# Patient Record
Sex: FEMALE | HISPANIC OR LATINO | Employment: STUDENT | URBAN - METROPOLITAN AREA
[De-identification: names, ages, dates, MRNs, and addresses within clinical notes are randomized per-mention and may not be internally consistent; named-entity substitution may affect disease eponyms.]

---
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
## 2024-05-23 ENCOUNTER — APPOINTMENT (OUTPATIENT)
Age: 19
End: 2024-05-23
Payer: COMMERCIAL

## 2024-05-23 ENCOUNTER — OFFICE VISIT (OUTPATIENT)
Age: 19
End: 2024-05-23
Payer: COMMERCIAL

## 2024-05-23 VITALS
OXYGEN SATURATION: 95 % | HEART RATE: 88 BPM | HEIGHT: 64 IN | DIASTOLIC BLOOD PRESSURE: 74 MMHG | RESPIRATION RATE: 17 BRPM | WEIGHT: 225.4 LBS | BODY MASS INDEX: 38.48 KG/M2 | TEMPERATURE: 97.5 F | SYSTOLIC BLOOD PRESSURE: 114 MMHG

## 2024-05-23 DIAGNOSIS — Z11.4 SCREENING FOR HIV (HUMAN IMMUNODEFICIENCY VIRUS): ICD-10-CM

## 2024-05-23 DIAGNOSIS — Z00.00 ANNUAL PHYSICAL EXAM: ICD-10-CM

## 2024-05-23 DIAGNOSIS — Z00.00 ANNUAL PHYSICAL EXAM: Primary | ICD-10-CM

## 2024-05-23 DIAGNOSIS — Z11.59 NEED FOR HEPATITIS C SCREENING TEST: ICD-10-CM

## 2024-05-23 DIAGNOSIS — R63.5 WEIGHT GAIN: ICD-10-CM

## 2024-05-23 LAB
25(OH)D3 SERPL-MCNC: 22 NG/ML (ref 30–100)
ALBUMIN SERPL BCP-MCNC: 4.3 G/DL (ref 3.5–5)
ALP SERPL-CCNC: 70 U/L (ref 34–104)
ALT SERPL W P-5'-P-CCNC: 15 U/L (ref 7–52)
ANION GAP SERPL CALCULATED.3IONS-SCNC: 10 MMOL/L (ref 4–13)
AST SERPL W P-5'-P-CCNC: 16 U/L (ref 13–39)
BASOPHILS # BLD AUTO: 0.06 THOUSANDS/ÂΜL (ref 0–0.1)
BASOPHILS NFR BLD AUTO: 1 % (ref 0–1)
BILIRUB SERPL-MCNC: 0.42 MG/DL (ref 0.2–1)
BUN SERPL-MCNC: 9 MG/DL (ref 5–25)
CALCIUM SERPL-MCNC: 9.3 MG/DL (ref 8.4–10.2)
CHLORIDE SERPL-SCNC: 104 MMOL/L (ref 96–108)
CO2 SERPL-SCNC: 25 MMOL/L (ref 21–32)
CREAT SERPL-MCNC: 0.72 MG/DL (ref 0.6–1.3)
EOSINOPHIL # BLD AUTO: 0.09 THOUSAND/ÂΜL (ref 0–0.61)
EOSINOPHIL NFR BLD AUTO: 1 % (ref 0–6)
ERYTHROCYTE [DISTWIDTH] IN BLOOD BY AUTOMATED COUNT: 13.8 % (ref 11.6–15.1)
EST. AVERAGE GLUCOSE BLD GHB EST-MCNC: 111 MG/DL
FERRITIN SERPL-MCNC: 5 NG/ML (ref 11–307)
GFR SERPL CREATININE-BSD FRML MDRD: 121 ML/MIN/1.73SQ M
GLUCOSE P FAST SERPL-MCNC: 88 MG/DL (ref 65–99)
HBA1C MFR BLD: 5.5 %
HCT VFR BLD AUTO: 40.8 % (ref 34.8–46.1)
HCV AB SER QL: NORMAL
HGB BLD-MCNC: 12.8 G/DL (ref 11.5–15.4)
HIV 1+2 AB+HIV1 P24 AG SERPL QL IA: NORMAL
HIV 2 AB SERPL QL IA: NORMAL
HIV1 AB SERPL QL IA: NORMAL
HIV1 P24 AG SERPL QL IA: NORMAL
IMM GRANULOCYTES # BLD AUTO: 0.02 THOUSAND/UL (ref 0–0.2)
IMM GRANULOCYTES NFR BLD AUTO: 0 % (ref 0–2)
IRON SATN MFR SERPL: 12 % (ref 15–50)
IRON SERPL-MCNC: 50 UG/DL (ref 50–212)
LYMPHOCYTES # BLD AUTO: 2.42 THOUSANDS/ÂΜL (ref 0.6–4.47)
LYMPHOCYTES NFR BLD AUTO: 29 % (ref 14–44)
MCH RBC QN AUTO: 26.8 PG (ref 26.8–34.3)
MCHC RBC AUTO-ENTMCNC: 31.4 G/DL (ref 31.4–37.4)
MCV RBC AUTO: 86 FL (ref 82–98)
MONOCYTES # BLD AUTO: 0.45 THOUSAND/ÂΜL (ref 0.17–1.22)
MONOCYTES NFR BLD AUTO: 5 % (ref 4–12)
NEUTROPHILS # BLD AUTO: 5.28 THOUSANDS/ÂΜL (ref 1.85–7.62)
NEUTS SEG NFR BLD AUTO: 64 % (ref 43–75)
NRBC BLD AUTO-RTO: 0 /100 WBCS
PLATELET # BLD AUTO: 408 THOUSANDS/UL (ref 149–390)
PMV BLD AUTO: 10.7 FL (ref 8.9–12.7)
POTASSIUM SERPL-SCNC: 4 MMOL/L (ref 3.5–5.3)
PROT SERPL-MCNC: 7.6 G/DL (ref 6.4–8.4)
RBC # BLD AUTO: 4.77 MILLION/UL (ref 3.81–5.12)
SODIUM SERPL-SCNC: 139 MMOL/L (ref 135–147)
T4 FREE SERPL-MCNC: 0.71 NG/DL (ref 0.61–1.12)
TIBC SERPL-MCNC: 404 UG/DL (ref 250–450)
TSH SERPL DL<=0.05 MIU/L-ACNC: 0.84 UIU/ML (ref 0.45–4.5)
UIBC SERPL-MCNC: 354 UG/DL (ref 155–355)
WBC # BLD AUTO: 8.32 THOUSAND/UL (ref 4.31–10.16)

## 2024-05-23 PROCEDURE — 83036 HEMOGLOBIN GLYCOSYLATED A1C: CPT

## 2024-05-23 PROCEDURE — 86803 HEPATITIS C AB TEST: CPT

## 2024-05-23 PROCEDURE — 84439 ASSAY OF FREE THYROXINE: CPT

## 2024-05-23 PROCEDURE — 99213 OFFICE O/P EST LOW 20 MIN: CPT

## 2024-05-23 PROCEDURE — 99395 PREV VISIT EST AGE 18-39: CPT

## 2024-05-23 PROCEDURE — 82728 ASSAY OF FERRITIN: CPT

## 2024-05-23 PROCEDURE — 87389 HIV-1 AG W/HIV-1&-2 AB AG IA: CPT

## 2024-05-23 PROCEDURE — 84443 ASSAY THYROID STIM HORMONE: CPT

## 2024-05-23 PROCEDURE — 83550 IRON BINDING TEST: CPT

## 2024-05-23 PROCEDURE — 83540 ASSAY OF IRON: CPT

## 2024-05-23 PROCEDURE — 85025 COMPLETE CBC W/AUTO DIFF WBC: CPT

## 2024-05-23 PROCEDURE — 80053 COMPREHEN METABOLIC PANEL: CPT

## 2024-05-23 PROCEDURE — 82306 VITAMIN D 25 HYDROXY: CPT

## 2024-05-23 PROCEDURE — 36415 COLL VENOUS BLD VENIPUNCTURE: CPT

## 2024-05-23 NOTE — ASSESSMENT & PLAN NOTE
Patient has had an unexpected 60 pound weight gain over the past year.  This is her second year in college and she was away for her freshman year as well.  She states she did not change her diet and her workout habits have been similar.  Asked patient to keep a food diary for 2 to 3 days and send it to me.  She did not find dietitians helpful in the past.    I have a low suspicion for PCOS as she does not have hirsutism, skin hyperpigmentation, cushingoid appearance, and she has regular menses.  Will check some basic lab work including a thyroid panel.  Her last thyroid in January 2023 was normal.

## 2024-05-23 NOTE — PROGRESS NOTES
Adult Annual Physical  Name: Phoebe Arriaza      : 2005      MRN: [id number]  Encounter Provider: Monika Klein PA-C  Encounter Date: 2024   Encounter department: [facility] [place]    Assessment & Plan   1. Annual physical exam  -     CBC and differential; Future  -     Comprehensive metabolic panel; Future  2. Weight gain  Assessment & Plan:  Patient has had an unexpected 60 pound weight gain over the past year.  This is her second year in college and she was away for her freshman year as well.  She states she did not change her diet and her workout habits have been similar.  Asked patient to keep a food diary for 2 to 3 days and send it to me.  She did not find dietitians helpful in the past.    I have a low suspicion for PCOS as she does not have hirsutism, skin hyperpigmentation, cushingoid appearance, and she has regular menses.  Will check some basic lab work including a thyroid panel.  Her last thyroid in 2023 was normal.  Orders:  -     Hemoglobin A1C; Future  -     Vitamin D 25 hydroxy; Future  -     Iron Panel (Includes Ferritin, Iron Sat%, Iron, and TIBC); Future  -     TSH + Free T4; Future  -     US thyroid; Future; Expected date: 2024  3. Need for hepatitis C screening test  Comments:  Patient agrees to screening  Orders:  -     Hepatitis C Antibody; Future  4. Screening for HIV (human immunodeficiency virus)  Comments:  Patient agrees to screening  Orders:  -     HIV 1/2 AG/AB w Reflex [facility] for 2 yr old and above; Future    Immunizations and preventive care screenings were discussed with patient today. Appropriate education was printed on patient's after visit summary.    Patient declines screening for chlamydia today, states she is not sexually active.    Counseling:  Alcohol/drug use: discussed moderation in alcohol intake, the recommendations for healthy alcohol use, and avoidance of illicit drug use.  Dental Health: discussed importance of  regular tooth brushing, flossing, and dental visits.  Injury prevention: discussed safety/seat belts, safety helmets, smoke detectors, carbon dioxide detectors, and smoking near bedding or upholstery.  Sexual health: discussed sexually transmitted diseases, partner selection, use of condoms, avoidance of unintended pregnancy, and contraceptive alternatives.  Exercise: the importance of regular exercise/physical activity was discussed. Recommend exercise 3-5 times per week for at least 30 minutes.       Depression Screening and Follow-up Plan: Patient was screened for depression during today's encounter. They screened negative with a PHQ-2 score of 0.        History of Present Illness   {Disappearing Hyperlinks I Encounters * My Last Note * Since Last Visit * History :[id number]}  Adult Annual Physical:  Patient presents for annual physical.     Diet and Physical Activity:  - Diet/Nutrition: well balanced diet. Vegan, works hard to gain of protein.  - Exercise: walking, moderate cardiovascular exercise, strength training exercises and 3-4 times a week on average.    Depression Screening:  - PHQ-2 Score: 0    General Health:  - Sleep: sleeps well.  - Hearing: normal hearing bilateral ears.  - Vision: no vision problems, wears glasses and goes for regular eye exams.  - Dental: regular dental visits.    /GYN Health:  - Follows with GYN: no.   - Menopause: premenopausal.   - History of STDs: no    Advanced Care Planning:  - Has an advanced directive?: no    - Has a durable medical POA?: no    - ACP document given to patient?: no        Pt is here for annual physical and concerns about her thyroid.  Pt noticed sudden weight gain over the past year since being away at college.  She went from 160 to 225 lbs.  She consistently works out, doesn't feel her diet is any different.  She notes lack of arm and leg hair and her neck is enlarged.    Feels her energy levels are the same.  Pt went to two dieticians in the past.  She  "didn't find it particularly helpful.  Pt is vegan.  Patient reports regular periods.  Denies hirsutism.    No major medical issues as a child.  Father has hypertension.  No diabetes, cancer, or CVD otherwise.    Review of Systems   Constitutional:  Positive for unexpected weight change (Weight gain of 60 pounds over past year).   HENT: Negative.     Respiratory:  Negative for cough and shortness of breath.    Cardiovascular:  Negative for chest pain.   Gastrointestinal: Negative.    Genitourinary: Negative.    Musculoskeletal:  Negative for gait problem.   Skin:  Negative for rash.   Neurological:  Negative for syncope, light-headedness and headaches.   All other systems reviewed and are negative.    Medical History Reviewed by provider this encounter:  Tobacco  Allergies  Meds  Problems  Med Hx  Surg Hx  Fam Hx       Current Outpatient Medications on File Prior to Visit   Medication Sig Dispense Refill   • ASHWAGANDHA PO Take by mouth     • CALCIUM ACETATE, PHOS BINDER, PO Take 667 mg by mouth 3 (three) times a day with meals     • Cyanocobalamin (VITAMIN B-12 PO) Take by mouth daily     • Iodine Strong, Lug[place], (IODINE STRONG PO) Take 0.2 mL by mouth 3 (three) times a day       No current facility-administered medications on file prior to visit.      Social History     Tobacco Use   • Smoking status: Never   • Smokeless tobacco: Never   Vaping Use   • Vaping status: Never Used   Substance and Sexual Activity   • Alcohol use: Never   • Drug use: Never   • Sexual activity: Not Currently     Partners: Male     Birth control/protection: Condom Male, Condom Female       Objective   {Disappearing Hyperlinks   Review Vitals * Enter New Vitals * Results Review * Labs * Imaging * Cardiology * Procedures * Lung Cancer Screening :[id number]}  /74 (BP Location: Left arm, Patient Position: Sitting, Cuff Size: Large)   Pulse 88   Temp 97.5 °F (36.4 °C) (Tympanic)   Resp 17   Ht 5' 4\" (1.626 m)   Wt 102 kg (225 lb " 6.4 oz)   SpO2 95%   BMI 38.69 kg/m²     Physical Exam  Vitals and nursing note reviewed.   Constitutional:       General: She is not in acute distress.     Appearance: Normal appearance. She is not toxic-appearing.   HENT:      Head: Normocephalic and atraumatic.      Jaw: There is normal jaw occlusion.      Right Ear: Hearing, tympanic membrane, ear canal and external ear normal.      Left Ear: Hearing, tympanic membrane, ear canal and external ear normal.      Nose: Nose normal.      Mouth/Throat:      Lips: Pink.      Mouth: Mucous membranes are moist. No oral lesions.      Tongue: No lesions.      Palate: No mass.      Pharynx: Oropharynx is clear. Uvula midline.   Eyes:      General: Lids are normal. Vision grossly intact.      Conjunctiva/sclera: Conjunctivae normal.      Pupils: Pupils are equal, round, and reactive to light.   Neck:      Thyroid: No thyroid mass, thyromegaly or thyroid tenderness.   Cardiovascular:      Rate and Rhythm: Normal rate and regular rhythm.      Pulses:           Radial pulses are 2+ on the right side and 2+ on the left side.        Dorsalis pedis pulses are 2+ on the right side and 2+ on the left side.      Heart sounds: Normal heart sounds. No murmur heard.  Pulmonary:      Effort: Pulmonary effort is normal. No respiratory distress.      Breath sounds: Normal breath sounds.   Abdominal:      General: Bowel sounds are normal.      Palpations: Abdomen is soft.      Tenderness: There is no abdominal tenderness. There is no right CVA tenderness or left CVA tenderness.   Musculoskeletal:         General: No tenderness, deformity or signs of injury.      Cervical back: Normal and full passive range of motion without pain.      Thoracic back: Normal.      Lumbar back: Normal.      Right lower leg: No edema.      Left lower leg: No edema.   Lymphadenopathy:      Cervical: No cervical adenopathy.   Skin:     General: Skin is warm and dry.      Capillary Refill: Capillary refill takes  less than 2 seconds.      Coloration: Skin is not jaundiced.   Neurological:      General: No focal deficit present.      Mental Status: She is alert and oriented to person, place, and time. Mental status is at baseline.      GCS: GCS eye subscore is 4. GCS verbal subscore is 5. GCS motor subscore is 6.      Gait: Gait is intact.   Psychiatric:         Mood and Affect: Mood normal.         Behavior: Behavior is cooperative.       Administrative Statements {Disappearing Hyperlinks I  [facility] Service * [id number]}

## 2024-05-28 DIAGNOSIS — E61.1 IRON DEFICIENCY: ICD-10-CM

## 2024-05-28 DIAGNOSIS — E55.9 VITAMIN D DEFICIENCY: Primary | ICD-10-CM

## 2024-05-28 RX ORDER — FERROUS SULFATE 324(65)MG
324 TABLET, DELAYED RELEASE (ENTERIC COATED) ORAL
Qty: 90 TABLET | Refills: 1 | Status: SHIPPED | OUTPATIENT
Start: 2024-05-28 | End: 2024-11-24

## 2024-06-04 ENCOUNTER — HOSPITAL ENCOUNTER (OUTPATIENT)
Dept: ULTRASOUND IMAGING | Facility: HOSPITAL | Age: 19
Discharge: HOME | End: 2024-06-04
Payer: COMMERCIAL

## 2024-06-04 DIAGNOSIS — R63.5 WEIGHT GAIN: ICD-10-CM

## 2024-06-04 PROCEDURE — 76536 US EXAM OF HEAD AND NECK: CPT

## 2025-03-03 ENCOUNTER — OFFICE VISIT (OUTPATIENT)
Age: 20
End: 2025-03-03
Payer: COMMERCIAL

## 2025-03-03 VITALS
TEMPERATURE: 97.8 F | SYSTOLIC BLOOD PRESSURE: 128 MMHG | OXYGEN SATURATION: 98 % | RESPIRATION RATE: 18 BRPM | HEIGHT: 64 IN | WEIGHT: 228 LBS | HEART RATE: 110 BPM | DIASTOLIC BLOOD PRESSURE: 88 MMHG | BODY MASS INDEX: 38.93 KG/M2

## 2025-03-03 DIAGNOSIS — K80.20 GALLSTONES: Primary | ICD-10-CM

## 2025-03-03 PROCEDURE — 99213 OFFICE O/P EST LOW 20 MIN: CPT

## 2025-03-03 NOTE — PROGRESS NOTES
Name: Phoebe Arriaza      : 2005      MRN: [id number]  Encounter Provider: Amber Charles PA-C  Encounter Date: 3/3/2025   Encounter department: [facility] PRIMARY CARE [place]  :  Assessment & Plan  Gallstones  RUQ US results not visible in the chart. Imaging was performed through [facility]. Reviewed imaging results on the patient's phone through her patient portal, which showed several small gallstones without blockage of the bile ducts. Discussed the without blockage of the ducts or inflammation of the gallbladder, treatment is typically conservative by limiting fatty foods. Discussed that if pain persists, we can have her follow up with a general surgeon for further evaluation and discussion. Patient declines referral at this time. If pain becomes severe or is associated with fever or chills, return to the ER.   I recommended that she send us a copy of the RUQ US results for our records or contact [facility] to fax over the results.               History of Present Illness   Patient presents to the office for follow up of gallstones. She had two episodes of RUQ pain over the past week, one was Saturday night and was severe, so she went to [facility] ER early  morning and was diagnosed with gallstones, although she was told that they did not recommend surgery because there was no blockage or infection. She still has mild RUQ pain. No nausea, vomiting, fevers, chills. No known triggers or association with eating meals.           Review of Systems   Constitutional:  Negative for chills and fever.   HENT:  Negative for ear pain and sore throat.    Eyes:  Negative for pain and visual disturbance.   Respiratory:  Negative for cough and shortness of breath.    Cardiovascular:  Negative for chest pain and palpitations.   Gastrointestinal:  Positive for abdominal pain. Negative for diarrhea, nausea and vomiting.   Genitourinary:  Negative for dysuria and hematuria.   Musculoskeletal:  Negative  "for arthralgias and back pain.   Skin:  Negative for color change and rash.   Neurological:  Negative for seizures and syncope.   All other systems reviewed and are negative.      Objective   /88 (BP Location: Right arm, Patient Position: Sitting, Cuff Size: Large)   Pulse (!) 110   Temp 97.8 °F (36.6 °C) (Tympanic)   Resp 18   Ht 5' 4\" (1.626 m)   Wt 103 kg (228 lb)   SpO2 98%   BMI 39.14 kg/m²      Physical Exam  Vitals and nursing note reviewed.   Constitutional:       General: She is not in acute distress.     Appearance: She is well-developed.   HENT:      Head: Normocephalic and atraumatic.      Right Ear: Tympanic membrane normal.      Left Ear: Tympanic membrane normal.      Nose: Nose normal.      Mouth/Throat:      Mouth: Mucous membranes are moist.      Pharynx: Oropharynx is clear. No oropharyngeal exudate.   Eyes:      Extraocular Movements: Extraocular movements intact.      Conjunctiva/sclera: Conjunctivae normal.   Cardiovascular:      Rate and Rhythm: Normal rate and regular rhythm.      Heart sounds: Normal heart sounds. No murmur heard.     No friction rub. No gallop.   Pulmonary:      Effort: Pulmonary effort is normal. No respiratory distress.      Breath sounds: Normal breath sounds. No wheezing, rhonchi or rales.   Abdominal:      Palpations: Abdomen is soft.      Tenderness: There is abdominal tenderness in the right upper quadrant. There is no guarding. Negative signs include Villanueva's sign.      Comments: Mild RUQ tenderness. Negative peyton's sign.    Musculoskeletal:         General: No swelling.      Cervical back: Neck supple.   Skin:     General: Skin is warm and dry.      Capillary Refill: Capillary refill takes less than 2 seconds.   Neurological:      General: No focal deficit present.      Mental Status: She is alert.   Psychiatric:         Mood and Affect: Mood normal.         "

## 2025-03-03 NOTE — LETTER
March 3, 2025     Patient: Phoebe Arriaza  YOB: 2005  Date of Visit: 3/3/2025      To Whom it May Concern:    Phoebe Arriaza is under my professional care. Phoebe was seen in my office on 3/3/2025. Phoebe may return to school on 3/4/2025 .    If you have any questions or concerns, please don't hesitate to call.         Sincerely,          Amber Charles PA-C        CC: No Recipients

## 2025-03-19 ENCOUNTER — OFFICE VISIT (OUTPATIENT)
Age: 20
End: 2025-03-19
Payer: COMMERCIAL

## 2025-03-19 VITALS
HEART RATE: 89 BPM | DIASTOLIC BLOOD PRESSURE: 82 MMHG | TEMPERATURE: 97.8 F | OXYGEN SATURATION: 97 % | SYSTOLIC BLOOD PRESSURE: 126 MMHG | WEIGHT: 226.4 LBS | RESPIRATION RATE: 18 BRPM | BODY MASS INDEX: 38.65 KG/M2 | HEIGHT: 64 IN

## 2025-03-19 DIAGNOSIS — K80.20 CALCULUS OF GALLBLADDER WITHOUT CHOLECYSTITIS WITHOUT OBSTRUCTION: ICD-10-CM

## 2025-03-19 DIAGNOSIS — F43.21 ADJUSTMENT DISORDER WITH DEPRESSED MOOD: Primary | ICD-10-CM

## 2025-03-19 PROCEDURE — 99214 OFFICE O/P EST MOD 30 MIN: CPT

## 2025-03-19 NOTE — LETTER
March 20, 2025     Patient: Phoebe Arriaza  YOB: 2005  Date of Visit: 3/19/2025      To Whom it May Concern:    To Whom it May Concern:    Phoebe Arriaza is under my professional care. Phoebe was seen in my office on 3/19/2025. Phoebe has adjustment disorder with depressed mood.  This is a condition characterized by a specific trigger causing anxiety and/or depression which has been present for at least three months and has a profound effect on Phoebe's ability to function with her daily tasks.  When the cause for the disorder is removed, symptoms are expected to diminish over the following six months.  This would span past her attendance during the Spring 2025 semester.  This started in January 2025 and is expected to be in remission in June 2025.      Phoebe will be living at home to participate in her treatment plan.  She lives more than 2 hours away from the school campus.  This distance makes it impractical to commute to classes on a regular basis.  I respectfully request accommodations to allow Phoebe to do her regular course work, class participation, and quizzes remotely from home and any tests/final exams or presentations can remain in person for the duration of the Spring 2025 semester.    If you have any questions or concerns, please don't hesitate to call.         Sincerely,          Monika Klein PA-C        CC: No Recipients

## 2025-03-19 NOTE — PROGRESS NOTES
Name: Phoebe Arriaza      : 2005      MRN: [id number]  Encounter Provider: Monika Klein PA-C  Encounter Date: 3/19/2025   Encounter department: [facility] PRIMARY CARE [place]  :  Assessment & Plan  Adjustment disorder with depressed mood  Discussed whether medications would be indicated for her moods.  She doesn't want to start medications at this time and wishes to treat with lifestyle changes.  She was strongly urged to find a therapist to help her with learning coping strategies.  Support provided.  Answered all questions.   She stayed home for the Spring semester, which has been 3 months in duration.  The expected duration of her condition until remission is 6 months.  Expected remission for this would be three months from today.    Letter will be written on the patient's behalf to allow for remote completion of her class with the ability to attend for tests due to the distance from school.  Pt can pick this up tomorrow morning and a copy will be sent through her [employer] portal.       Calculus of gallbladder without cholecystitis without obstruction  Stable, no further abdominal pain.  She made diet changes and has had no further episodes of abdominal pain.             Depression Screening and Follow-up Plan: Patient was screened for depression during today's encounter. They screened negative with a PHQ-2 score of 0.        History of Present Illness   HPI    Pt is here with her father requesting accommodations for school.  She was having a difficult time adjusting to school.  She gained quite a bit of weight, did not get along with her room mates in the dorm, and has increased depressive symptoms.  She elected to move back home and most of her professors are accommodating with the fact that she has to drive two hours to school and have allowed her to do more of the work remotely.  However, one professor told her needs official accommodations and she is requesting a note for those  "accommodations.    Since coming home, she has been working out, eating better, and has lost 6 lbs.  She feels more mentally able to do her school work and feels less stress and depression.  She plans to go to [employer] for the fall semester.  She is unsure if she will move back to the dorm in the Spring 2026 semester or transfer to another campus.  She has not started therapy yet, but is thinking about doing so.     In regard to her cholelithiasis, she had an u/s done at [facility] which showed non obstructing stones.  Pt's labs have been WNL.  She was offered general surgery referral, but declined at that time opting for conservative management with diet.  She was given GS referral from the ER at [facility].      Review of Systems   Constitutional:  Negative for chills and fever.   HENT:  Negative for ear pain and sore throat.    Eyes:  Negative for pain and visual disturbance.   Respiratory:  Negative for cough and shortness of breath.    Cardiovascular:  Negative for chest pain and palpitations.   Gastrointestinal:  Negative for abdominal pain (much improved), diarrhea, nausea and vomiting.   Genitourinary:  Negative for dysuria and hematuria.   Musculoskeletal:  Negative for arthralgias and back pain.   Skin:  Negative for color change and rash.   Neurological:  Negative for seizures and syncope.   Psychiatric/Behavioral:  Positive for decreased concentration, dysphoric mood and sleep disturbance. Negative for behavioral problems and suicidal ideas. The patient is nervous/anxious.    All other systems reviewed and are negative.      Objective   /82 (BP Location: Left arm, Patient Position: Sitting, Cuff Size: Standard)   Pulse 89   Temp 97.8 °F (36.6 °C) (Tympanic)   Resp 18   Ht 5' 4\" (1.626 m)   Wt 103 kg (226 lb 6.4 oz)   SpO2 97%   BMI 38.86 kg/m²      Physical Exam  Vitals and nursing note reviewed.   Constitutional:       General: She is not in acute distress.     Appearance: She is " well-developed. She is not toxic-appearing.   HENT:      Head: Normocephalic and atraumatic.      Nose: Nose normal.      Mouth/Throat:      Mouth: Mucous membranes are moist.   Eyes:      Extraocular Movements: Extraocular movements intact.      Conjunctiva/sclera: Conjunctivae normal.   Cardiovascular:      Rate and Rhythm: Normal rate and regular rhythm.      Heart sounds: Normal heart sounds. No murmur heard.  Pulmonary:      Effort: Pulmonary effort is normal. No respiratory distress.   Abdominal:      Palpations: Abdomen is soft.      Tenderness: There is no abdominal tenderness. There is no guarding. Negative signs include Villanueva's sign.   Musculoskeletal:         General: No swelling.      Cervical back: Neck supple.   Skin:     General: Skin is warm and dry.      Capillary Refill: Capillary refill takes less than 2 seconds.   Neurological:      Mental Status: She is alert and oriented to person, place, and time.      Gait: Gait is intact.   Psychiatric:         Mood and Affect: Mood is anxious and depressed.         Behavior: Behavior is cooperative.         Thought Content: Thought content normal.

## 2025-03-19 NOTE — LETTER
March 19, 2025     Patient: Phoebe Arriaza  YOB: 2005  Date of Visit: 3/19/2025      To Whom it May Concern:    Phoebe Arriaza is under my professional care. Phoebe was seen in my office on 3/19/2025. Phoebe has adjustment disorder with depressed mood.  This is a condition that is characterized by a specific trigger causing anxiety and depression which has been present for at least three months and has a profound effect on Phoebe's ability to function with her daily tasks.  When the cause for the disorder is removed, symptoms are expected to diminish over the following six months.  This would span past her attendance during the Spring 2025 semester.    Phoebe will be living at home to participate in her treatment plan.  She lives more than 2 hours away from the school campus.  This distance makes it impractical to commute to classes on a regular basis.  I respectfully request accommodations to allow Phoebe to do her regular course work, class participation, and quizzes remotely from home and any tests/final exams or presentations can remain in person for the remainer of the Spring 2025 semester.    If you have any questions or concerns, please don't hesitate to call.         Sincerely,          Monika Klein PA-C

## 2025-06-02 ENCOUNTER — OFFICE VISIT (OUTPATIENT)
Age: 20
End: 2025-06-02
Payer: COMMERCIAL

## 2025-06-02 VITALS
HEIGHT: 64 IN | HEART RATE: 68 BPM | SYSTOLIC BLOOD PRESSURE: 130 MMHG | BODY MASS INDEX: 37.73 KG/M2 | RESPIRATION RATE: 16 BRPM | OXYGEN SATURATION: 98 % | WEIGHT: 221 LBS | DIASTOLIC BLOOD PRESSURE: 76 MMHG | TEMPERATURE: 98 F

## 2025-06-02 DIAGNOSIS — Z00.00 ANNUAL PHYSICAL EXAM: Primary | ICD-10-CM

## 2025-06-02 DIAGNOSIS — Z02.89 ENCOUNTER FOR COMPLETION OF FORM WITH PATIENT: ICD-10-CM

## 2025-06-02 DIAGNOSIS — F43.21 ADJUSTMENT DISORDER WITH DEPRESSED MOOD: ICD-10-CM

## 2025-06-02 DIAGNOSIS — Z11.3 SCREENING FOR STDS (SEXUALLY TRANSMITTED DISEASES): ICD-10-CM

## 2025-06-02 DIAGNOSIS — E55.9 VITAMIN D DEFICIENCY: ICD-10-CM

## 2025-06-02 DIAGNOSIS — K80.20 GALLSTONES: ICD-10-CM

## 2025-06-02 PROCEDURE — 99395 PREV VISIT EST AGE 18-39: CPT

## 2025-06-02 NOTE — PROGRESS NOTES
Adult Annual Physical  Name: Phoebe Arriaza      : 2005      MRN: [id number]  Encounter Provider: Amber Charles PA-C  Encounter Date: 2025   Encounter department: [facility]    :  Assessment & Plan  Annual physical exam  - Discussed healthy diet, lifestyle, and screening recommendations with the patient. Appropriate orders placed.   - recent lab results reviewed and/or lab orders placed as appropriate for monitoring of the patient's chronic conditions or evaluation of acute complaints  - We do not have immunization records on file for the patient.  She had them done out of network by her pediatrician. Recommend she send us a copy of her immunization records for our records.         Encounter for completion of form with patient  Patient brought study abroad form for completion for her study abroad trip to [facility] in the fall.  Form is asking about immunization history, which we do not have on file, so I recommend the patient bring us a copy of her immunization history so that I may update her form appropriately.  We will call the patient once her form is ready for pickup.       Adjustment disorder with depressed mood  Discussed at last office visit with April.  April's note reviewed. Sounds as though it was situational related to her roommate situation and patient states her mood has much improved since she moved home.  Not on medication.  Not on therapy at this time.  Recommend the patient follow-up with us if her mood or symptoms worsen.        Vitamin D deficiency  Level low at 22 last year.  Patient is currently taking 1000 IU vitamin D daily.  Recommend updating vitamin D level with lab work and if low, recommend increasing dose of vitamin D supplement.  I will follow-up with results and recommendations.  Orders:  •  Vitamin D 25 hydroxy    Screening for STDs (sexually transmitted diseases)  Discussed recommended screening for chlamydia, but patient declines        Gallstones  Patient seen by [facility] ER on 3/2/2025 for abdominal pain and was diagnosed with cholelithiasis without cholecystitis.  Symptoms have since improved with dietary changes.  No surgical intervention was needed.  [facility] ER note reviewed and is located under media scanned in on 3/5/2025.           Preventive Screenings:    - Cervical cancer screening: screening not indicated     Immunizations:  - Immunizations due: Tdap and HPV (Gardasil 9)      Depression Screening and Follow-up Plan: Patient was screened for depression during today's encounter. They screened negative with a PHQ-2 score of 0.          History of Present Illness     Adult Annual Physical:  Patient presents for annual physical. Phoebe presents for annual physical and for completion of a school form to study abroad in [place] in the fall semester.  She has no acute complaints or concerns today.  She is vegetarian and takes several supplements like vitamin D, B12, iodine to help support her diet.  She follows with an eye doctor but has not seen them for a couple of years.  She wears glasses and contacts.  She denies any recent abdominal pains.  She was seen in the ER a few months ago for gallstones and has since made some changes to her diet including reducing fats, cheese, dairy, avocado oil, etc.  No recurrence of abdominal pain.  .     Diet and Physical Activity:  - Diet/Nutrition: no special diet.  - Exercise: more than 2 hours on average, 3-4 times a week on average and 5-7 times a week on average. all different exercise, walking, biking, kayaking, swimming.    Depression Screening:  - PHQ-2 Score: 0    General Health:  - Sleep: 7-8 hours of sleep on average.  - Hearing: normal hearing bilateral ears.  - Vision: most recent eye exam > 1 year ago, wears glasses and wears contacts.  - Dental: brushes teeth twice daily, floss regularly and regular dental visits.    /GYN Health:  - Follows with GYN: no.   - Last  "menstrual cycle: 5/9/2025.   - History of STDs: no    Advanced Care Planning:  - Has an advanced directive?: no    - Has a durable medical POA?: no      Review of Systems   Constitutional:  Negative for chills and fever.   HENT:  Negative for congestion, ear pain and sore throat.    Eyes:  Negative for pain and visual disturbance.   Respiratory:  Negative for cough and shortness of breath.    Cardiovascular:  Negative for chest pain and palpitations.   Gastrointestinal:  Negative for abdominal pain, constipation, diarrhea and vomiting.   Genitourinary:  Negative for dysuria and hematuria.   Musculoskeletal:  Negative for arthralgias, back pain and myalgias.   Skin:  Negative for color change and rash.   Neurological:  Negative for dizziness, seizures, syncope and headaches.   All other systems reviewed and are negative.        Objective   /76 (BP Location: Right arm, Patient Position: Sitting, Cuff Size: Large)   Pulse 68   Temp 98 °F (36.7 °C) (Tympanic)   Resp 16   Ht 5' 4\" (1.626 m)   Wt 100 kg (221 lb)   LMP 05/09/2025   SpO2 98%   BMI 37.93 kg/m²     Physical Exam  Vitals and nursing note reviewed.   Constitutional:       General: She is not in acute distress.     Appearance: She is well-developed.   HENT:      Head: Normocephalic and atraumatic.      Right Ear: Tympanic membrane normal.      Left Ear: Tympanic membrane normal.      Nose: Nose normal.      Mouth/Throat:      Mouth: Mucous membranes are moist.      Pharynx: Oropharynx is clear. No oropharyngeal exudate.     Eyes:      Extraocular Movements: Extraocular movements intact.      Conjunctiva/sclera: Conjunctivae normal.       Cardiovascular:      Rate and Rhythm: Normal rate and regular rhythm.      Heart sounds: Normal heart sounds. No murmur heard.     No friction rub. No gallop.   Pulmonary:      Effort: Pulmonary effort is normal. No respiratory distress.      Breath sounds: Normal breath sounds. No wheezing, rhonchi or rales. "   Abdominal:      Palpations: Abdomen is soft.      Tenderness: There is no abdominal tenderness.     Musculoskeletal:         General: No swelling.      Cervical back: Neck supple.     Skin:     General: Skin is warm and dry.      Capillary Refill: Capillary refill takes less than 2 seconds.     Neurological:      General: No focal deficit present.      Mental Status: She is alert.     Psychiatric:         Mood and Affect: Mood normal.

## 2025-06-02 NOTE — ASSESSMENT & PLAN NOTE
Patient seen by [facility] ER on 3/2/2025 for abdominal pain and was diagnosed with cholelithiasis without cholecystitis.  Symptoms have since improved with dietary changes.  No surgical intervention was needed.  [facility] ER note reviewed and is located under media scanned in on 3/5/2025.

## 2025-06-02 NOTE — ASSESSMENT & PLAN NOTE
Discussed at last office visit with April.  April's note reviewed. Sounds as though it was situational related to her roommate situation and patient states her mood has much improved since she moved home.  Not on medication.  Not on therapy at this time.  Recommend the patient follow-up with us if her mood or symptoms worsen.

## 2025-06-04 ENCOUNTER — TELEPHONE (OUTPATIENT)
Age: 20
End: 2025-06-04